# Patient Record
Sex: FEMALE | Race: BLACK OR AFRICAN AMERICAN | NOT HISPANIC OR LATINO | URBAN - METROPOLITAN AREA
[De-identification: names, ages, dates, MRNs, and addresses within clinical notes are randomized per-mention and may not be internally consistent; named-entity substitution may affect disease eponyms.]

---

## 2023-03-31 ENCOUNTER — HOSPITAL ENCOUNTER (OUTPATIENT)
Dept: DATA CONVERSION | Facility: HOSPITAL | Age: 28
End: 2023-03-31
Attending: OBSTETRICS & GYNECOLOGY

## 2023-03-31 DIAGNOSIS — E66.9 OBESITY, UNSPECIFIED: ICD-10-CM
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
## 2023-03-31 DIAGNOSIS — M54.05 PANNICULITIS AFFECTING REGIONS OF NECK AND BACK, THORACOLUMBAR REGION: ICD-10-CM

## 2023-03-31 DIAGNOSIS — O99.212 OBESITY COMPLICATING PREGNANCY, SECOND TRIMESTER (HHS-HCC): ICD-10-CM

## 2023-03-31 DIAGNOSIS — Z3A.25 25 WEEKS GESTATION OF PREGNANCY (HHS-HCC): ICD-10-CM

## 2023-03-31 DIAGNOSIS — O99.891 OTHER SPECIFIED DISEASES AND CONDITIONS COMPLICATING PREGNANCY (HHS-HCC): ICD-10-CM

## 2023-03-31 DIAGNOSIS — M54.50 LOW BACK PAIN, UNSPECIFIED: ICD-10-CM

## 2023-03-31 LAB — POCT GLUCOSE: 94 MG/DL (ref 74–99)

## 2023-04-20 LAB
ERYTHROCYTE DISTRIBUTION WIDTH (RATIO) BY AUTOMATED COUNT: 11.9 % (ref 11.5–14.5)
ERYTHROCYTE MEAN CORPUSCULAR HEMOGLOBIN CONCENTRATION (G/DL) BY AUTOMATED: 31.7 G/DL (ref 32–36)
ERYTHROCYTE MEAN CORPUSCULAR VOLUME (FL) BY AUTOMATED COUNT: 97 FL (ref 80–100)
ERYTHROCYTES (10*6/UL) IN BLOOD BY AUTOMATED COUNT: 3.58 X10E12/L (ref 4–5.2)
GLUCOSE THREE HOUR: 98 MG/DL
GLUCOSE TWO HOUR: 102 MG/DL
GLUCOSE, FASTING: 75 MG/DL
GLUCOSE, ONE HOUR: 109 MG/DL
GTTCM: NORMAL
HEMATOCRIT (%) IN BLOOD BY AUTOMATED COUNT: 34.7 % (ref 36–46)
HEMOGLOBIN (G/DL) IN BLOOD: 11 G/DL (ref 12–16)
LEUKOCYTES (10*3/UL) IN BLOOD BY AUTOMATED COUNT: 10.5 X10E9/L (ref 4.4–11.3)
NRBC (PER 100 WBCS) BY AUTOMATED COUNT: 0 /100 WBC (ref 0–0)
PLATELETS (10*3/UL) IN BLOOD AUTOMATED COUNT: 279 X10E9/L (ref 150–450)
REFLEX ADDED, ANEMIA PANEL: ABNORMAL

## 2023-04-21 LAB — SYPHILIS TOTAL AB: NONREACTIVE

## 2023-06-15 LAB — GROUP B STREP SCREEN: NORMAL

## 2023-07-24 LAB
APPEARANCE, URINE: ABNORMAL
BACTERIA, URINE: ABNORMAL /HPF
BILIRUBIN, URINE: NEGATIVE
BLOOD, URINE: ABNORMAL
COLOR, URINE: ABNORMAL
GLUCOSE, URINE: NEGATIVE MG/DL
KETONES, URINE: NEGATIVE MG/DL
LEUKOCYTE ESTERASE, URINE: ABNORMAL
MUCUS, URINE: ABNORMAL /LPF
NITRITE, URINE: POSITIVE
PH, URINE: 5 (ref 5–8)
PROTEIN, URINE: ABNORMAL MG/DL
RBC, URINE: 57 /HPF (ref 0–5)
SPECIFIC GRAVITY, URINE: 1.02 (ref 1–1.03)
SQUAMOUS EPITHELIAL CELLS, URINE: 3 /HPF
TRANSITIONAL EPITHELIAL CELLS, URINE: 2 /HPF
UROBILINOGEN, URINE: <2 MG/DL (ref 0–1.9)
WBC, URINE: 59 /HPF (ref 0–5)

## 2023-07-27 LAB — URINE CULTURE: ABNORMAL

## 2023-09-07 VITALS — WEIGHT: 218.03 LBS | BODY MASS INDEX: 37.22 KG/M2 | HEIGHT: 64 IN

## 2023-09-14 NOTE — PROGRESS NOTES
Current Stage:   Stage: Triage     OB Dating:   EDC/EGA:  ·  EGA 25.3     Subjective Data:   Antepartum:  Vaginal Bleeding: No   Contractions/Abdominal Pain: No   Discharge/Loss of Fluid: No   Fetal Movement: Good   Fevers/Chills: No   Preeclampsia Symptoms: No   Antepartum:    Mandie is a 26yo  at 25.3 d/b LMP c/w 10wk US who presents s/p fall.    HPI: Fall occurred at midnight today as patient was rocking her 2 year old to sleep. She stood up from a chair, felt her leg was asleep d/t prolonged period of sitting and fell backwards into the chair. She hit her backside on the chair and her 2 year  old was in her arms, maybe hitting the top of her belly- pt is unsure. She reports good FM before and after the fall. She denies VB, LOF, ctx or crampy abd pain.     pregnancy notable for:  - failed 1 hr gtt, 3 hr gtt scheduled   - class II obesity     OBHx:   -  at 40wks   GynHx: no STIs, denies abnl PAPs   PMH: none  PSH: none  All: zofran  FamilyHx: brother w/ trisomy 18  Meds: PNV  Social: denies          Objective Information:    Objective Information:      T   P  R  BP   MAP  SpO2   Value  36.4  81  16  107/61   79  99%  Date/Time 3/31 15:29 3/31 16:35 3/31 15:29 3/31 15:29  3/31 15:29 3/31 16:35  Range  (36.4C - 36.4C )  (81 - 94 )  (16 - 16 )  (107 - 107 )/ (61 - 61 )  (79 - 79 )  (98% - 99% )      Pain reported at 3/31 15:29: 0 = None      Physical Exam:   Constitutional: alert, oriented   Obstetric: FHT: 145, mod variability, +accels, -decels   TOCO: quiet   Respiratory/Thorax: normal respiratory effort, on  room air   Cardiovascular: warm, well perfused   Gastrointestinal: gravid, soft, non-tender, no rigidity  or guarding   Musculoskeletal: LEARY   Extremities: no erythema, edema, or tenderness to  palpation of b/l calves   Neurological: no deficits   Psychological: appropriate affect   Skin: no rashes or lesions      Testing:   NST Interpretation - Baby A:  ·  Baseline    ·   Variability moderate (amplitude range 6 to 25 bpm)   ·  Interpretation Appropriate for EGA (2 10x10 accels)   ·  Accelerations +   ·  Decelerations -     Assessment and Plan:   Assessment:    Mandie is a 28yo  at 25.3 d/b LMP c/w 10wk US who presents s/p fall.    S/p Fall  - fall onto backside occurred 14 hrs ago w/o significant abdominal trauma  - good fetal movement, no vag bleeding, cramping or pain  - recommended tylenol, heating packs for lower back pain  - reviewed warning s/sx and return precautions      IUP at 25.3  - NST reactive  - Good fetal movement  - Continue routine prenatal care  - Precautions to return discussed     Maternal Well-being  - Vital signs stable and WNL  - Emotional support and reassurance provided  - All questions and concerns addressed    D/w Dr. Lew.    Sonya Santizo PA-C  [employer] / Vocera     Plan of Care Reviewed With:  Plan of Care Reviewed With: patient     Attestation:   Note Completion:  I am a:  Advanced Practice Provider   Attending Only - Shared Visit with Advanced Practice Provider This is a shared visit.  I have reviewed the Advanced Practice Provider?s encounter note, approve the Advanced Practice Provider?s documentation,  and provide the following additional information from my personal encounter.    Comments/ Additional Findings    Patient seen. Agree with assessment and plan as documented.     Nita Lew MD  OB/GYN Attending Physician           Electronic Signatures:  Nita Lew)  (Signed 31-Mar-2023 17:00)   Authored: Note Completion   Co-Signer: Current Stage, OB Dating, Subjective Data, Objective Data,  Testing, Assessment and Plan, Note Completion  Sonya Santizo (PAC)  (Signed 31-Mar-2023 16:51)   Authored: Current Stage, OB Dating, Subjective Data,  Objective Data,  Testing, Assessment and Plan, Note Completion      Last Updated: 31-Mar-2023 17:00 by Nita Lew)

## 2023-10-12 PROBLEM — N91.2 AMENORRHEA: Status: ACTIVE | Noted: 2023-10-12

## 2023-10-12 PROBLEM — N76.1 SUBACUTE VAGINITIS: Status: ACTIVE | Noted: 2023-10-12

## 2023-10-12 PROBLEM — K59.00 CONSTIPATION: Status: ACTIVE | Noted: 2023-10-12

## 2023-10-12 PROBLEM — K60.2 ANAL FISSURE: Status: ACTIVE | Noted: 2023-10-12

## 2023-10-12 PROBLEM — F41.9 ANXIETY: Status: ACTIVE | Noted: 2023-10-12

## 2023-11-16 ENCOUNTER — OFFICE VISIT (OUTPATIENT)
Dept: OBSTETRICS AND GYNECOLOGY | Facility: CLINIC | Age: 28
End: 2023-11-16
Payer: COMMERCIAL

## 2023-11-16 VITALS
HEIGHT: 65 IN | SYSTOLIC BLOOD PRESSURE: 110 MMHG | WEIGHT: 195 LBS | BODY MASS INDEX: 32.49 KG/M2 | DIASTOLIC BLOOD PRESSURE: 60 MMHG

## 2023-11-16 DIAGNOSIS — Z12.4 SCREENING FOR CERVICAL CANCER: ICD-10-CM

## 2023-11-16 DIAGNOSIS — Z01.419 WELL WOMAN EXAM: Primary | ICD-10-CM

## 2023-11-16 PROCEDURE — 88175 CYTOPATH C/V AUTO FLUID REDO: CPT

## 2023-11-16 PROCEDURE — 87624 HPV HI-RISK TYP POOLED RSLT: CPT

## 2023-11-16 PROCEDURE — 1036F TOBACCO NON-USER: CPT | Performed by: ADVANCED PRACTICE MIDWIFE

## 2023-11-16 PROCEDURE — 99395 PREV VISIT EST AGE 18-39: CPT | Performed by: ADVANCED PRACTICE MIDWIFE

## 2023-11-16 ASSESSMENT — ENCOUNTER SYMPTOMS
EYES NEGATIVE: 0
RESPIRATORY NEGATIVE: 0
NEUROLOGICAL NEGATIVE: 0
ENDOCRINE NEGATIVE: 0
GASTROINTESTINAL NEGATIVE: 0
MUSCULOSKELETAL NEGATIVE: 0
CARDIOVASCULAR NEGATIVE: 0
ALLERGIC/IMMUNOLOGIC NEGATIVE: 0
PSYCHIATRIC NEGATIVE: 0
HEMATOLOGIC/LYMPHATIC NEGATIVE: 0
CONSTITUTIONAL NEGATIVE: 0

## 2023-11-16 ASSESSMENT — PAIN SCALES - GENERAL: PAINLEVEL: 0-NO PAIN

## 2023-12-01 LAB
CYTOLOGY CMNT CVX/VAG CYTO-IMP: NORMAL
HPV HR 12 DNA GENITAL QL NAA+PROBE: NEGATIVE
HPV HR GENOTYPES PNL CVX NAA+PROBE: NEGATIVE
HPV16 DNA SPEC QL NAA+PROBE: NEGATIVE
HPV18 DNA SPEC QL NAA+PROBE: NEGATIVE
LAB AP CONTRACEPTIVE HISTORY: NORMAL
LAB AP HPV GENOTYPE QUESTION: YES
LAB AP HPV HR: NORMAL
LABORATORY COMMENT REPORT: NORMAL
PATH REPORT.TOTAL CANCER: NORMAL

## 2024-05-12 NOTE — PATIENT INSTRUCTIONS
It was nice meeting you today.    Please go to [facility] lab or another [facility] lab facility to complete the lab testing that I ordered     Please call referral line to schedule appts with: 1) genetics for family history of breast and colon cancer;2) GI for guidance on when to start  colon cancer screening given family history of polyps and colon cancer; ad 3) high risk breast clinic for guidance on when to start mammograms given family history of breast cancer      Please continue annual well woman visits with GYN    I recommend the updated COVID vaccine that can be obtained at your local pharmacy    Please schedule a follow up with me in 4  weeks to discuss and review your test results and 8 weeks for a physical

## 2024-05-12 NOTE — PROGRESS NOTES
"Subjective   Mandie Matson \"Jo\" is a 28 y.o. female who presents for NPV TO ESTABLISH PCP CARE and FOR CARE GAP REVIEW.    HPI:      29 yo female (smoking status) who presents for NPV TO ESTABLISH PCP CARE and FOR CARE GAP REVIEW.     EMR/EPIC records reviewed.    PMHx:  Anxiety  Anal fissure  constipation    Healthcare Providers:  GYN: Dior Peacock, APRN-CNM ; last seen 11/16/23    Preventive Health Services:  -Last physical: ?  -last pap smear: 11/16/23 Negative for intraepithelial lesion or malignancy.   -last mammogram: NEVER; POSITIVE Fhx BREAST CANCER: PATERNAL AUNT (passed in 30s/40s) and grandmother  (60s) passed breast cancer==> will refer to genetics ==>?? DUE at Age 39 yo  -last colonoscopy:  NEVER; POSITIVE FHx colon cancer: MATERNAL GF (passed at age 38)==> will refer to genetics ==>?? DUE at Age 44 yo  -last STI screening: ?  -Hep C screening: ?      Immunizations:   -Childhood vaccines: completed per patient    -COVID vaccine and booster: see below  -updated COVID spike vaccine: NOW DUE      Immunization History   Administered Date(s) Administered    DTaP, Unspecified 1993, 1993, 03/31/1994, 09/06/1996, 02/22/1999    Flu vaccine (IIV4), preservative free *Check age/dose* 11/19/2021, 12/04/2022    HPV, Quadrivalent 06/10/2009, 12/09/2009, 10/14/2010    Hepatitis A vaccine, pediatric/adolescent (HAVRIX, VAQTA) 06/10/2009, 12/09/2009    Hepatitis B vaccine, pediatric/adolescent (RECOMBIVAX, ENGERIX) 1993, 09/03/1997, 12/04/1997, 05/11/1998    HiB PRP-T conjugate vaccine (HIBERIX, ACTHIB) 12/04/1997    HiB, unspecified 1993, 1993, 03/31/1994    Influenza, Unspecified 10/19/2015, 10/13/2016, 10/07/2019    Influenza, injectable, quadrivalent 10/13/2017    Influenza, seasonal, injectable 10/23/2014    MMR vaccine, subcutaneous (MMR II) 09/06/1996, 02/22/1999, 10/09/2013    Meningococcal ACWY vaccine (MENVEO) 11/11/2011    Meningococcal MCV4P 01/30/2008    Pfizer " "Purple Cap SARS-CoV-2 11/19/2021, 01/17/2022    Polio, Unspecified 1993, 1993, 03/31/1994, 02/22/1999    Tdap vaccine, age 7 year and older (BOOSTRIX, ADACEL) 01/30/2008, 01/10/2018, 03/08/2021    Varicella vaccine, subcutaneous (VARIVAX) 09/23/1997, 01/30/2008       Today Jo  reports:    - ongoing intermittent carpal tunnel symptoms x 2+ months, characterized by numbness and tingling in thumb, second finger, and half of third finger rated in severity as 6-7/10, worsening over time. She expressed interest in referral to orthopedic surgery and neurology    -otherwise doing and feeling well.       Today  she has no reported complaints, issues, or problems.  ROS is NEG for HEADACHE, NAUSEA, VOMITING, DIARRHEA, CHEST PAIN, SOB, and BLEEDING.    Review of systems  (10+) is negative for all systems except for any identified issues in HPI above.        Objective     /80   Pulse 92   Temp 36.3 °C (97.4 °F)   Resp 18   Ht 1.638 m (5' 4.5\")   SpO2 99%   BMI 32.95 kg/m²      Physical Examination:       GENERAL           General Appearance: well-appearing, well-developed, well-hydrated, well-nourished, no acute distress.        HEENT           NECK supple, no masses or thyromegaly, no carotid bruit.        EYES           Extraocular Movements: normal, bilateral eyes MARICRUZ, no conjunctival injection.        HEART           Rate and Rhythm regular rate and rhythm. Heart sounds: normal S1S2, no S3 or S4. Murmurs: none.        CHEST           Breath sounds: Clear to IPPA, RR<16 no use of accessory muscles.        ABDOMEN           General: Neg for LKKS or masses, no scleral icterus or jaundice.        MUSCULOSKELETAL           Joints Demonstration: Neg for erythema, swelling or joint deformities. gross abnormalities no gross abnormalities.        EXTREMITIES           Lower Extremities: Neg for cyanosis, clubbing or edema.       Assessment/Plan   Problem List Items Addressed This Visit       Anxiety "     Other Visit Diagnoses       Encounter to establish care    -  Primary    Lipid screening        Diabetes mellitus screening        Vitamin D deficiency        Encounter for hepatitis C screening test for low risk patient        Routine screening for STI (sexually transmitted infection)        Immunization counseling              Establish PCP care  -labs ordered (see A/P above for details)    Lipid Disorder screening  -lipid panel ordered    Diabetes Screening  -HgBA1c ordered    Vitamin D deficiency  -Vit D levels ordered     Hep C screening  -Hep C antibody ordered     STI Screening:  -HIV, syphilis, GC/CT, trich ordered    Carpal tunnel symptoms:  -referral to orthopedic surgery and neurology ordered    Breast Cancer Screening: + Fhx breast cancer; MAMMOGRAM  likely DUE  before Age 40  -referral to genetics and high risk breast clinic ordered      Colon Cancer Screening: + Fhx polyps and colon cancer  -referral to GI ordered for guidance on age to start colonoscopy/colon cancer screening    Cervical Cancer Screening; last pap smear 11/16/23  -cont annual well woman visit and pap smears per GYN     Counseling:       Medication education:         Education:  The patient is counseled regarding potential side-effects of all new medications        Understanding:  Patient expressed understanding        Adherence:  No barriers to adherence identified        Immunizations Counseling  -recommend updated COVID spike vaccine that can be obtained at local pharmacy     FOLLOW-UP: 4 weeks to discuss and review test results and 8 weeks for PHYSICAL     Discussed recommended plan of care with patient. Patient expressed understanding and agreement with plan of care. All of patient's questions were answered at the time. Patient had no additional questions at the time.             Evie Campos MD, PhD

## 2024-05-13 ENCOUNTER — OFFICE VISIT (OUTPATIENT)
Dept: PRIMARY CARE | Facility: CLINIC | Age: 29
End: 2024-05-13
Payer: MEDICARE

## 2024-05-13 VITALS
TEMPERATURE: 97.4 F | SYSTOLIC BLOOD PRESSURE: 124 MMHG | OXYGEN SATURATION: 99 % | BODY MASS INDEX: 32.95 KG/M2 | RESPIRATION RATE: 18 BRPM | HEART RATE: 92 BPM | DIASTOLIC BLOOD PRESSURE: 80 MMHG | HEIGHT: 65 IN

## 2024-05-13 DIAGNOSIS — Z11.3 ROUTINE SCREENING FOR STI (SEXUALLY TRANSMITTED INFECTION): ICD-10-CM

## 2024-05-13 DIAGNOSIS — Z11.59 ENCOUNTER FOR HEPATITIS C SCREENING TEST FOR LOW RISK PATIENT: ICD-10-CM

## 2024-05-13 DIAGNOSIS — E55.9 VITAMIN D DEFICIENCY: ICD-10-CM

## 2024-05-13 DIAGNOSIS — Z76.89 ENCOUNTER TO ESTABLISH CARE: Primary | ICD-10-CM

## 2024-05-13 DIAGNOSIS — Z13.220 LIPID SCREENING: ICD-10-CM

## 2024-05-13 DIAGNOSIS — F41.9 ANXIETY: ICD-10-CM

## 2024-05-13 DIAGNOSIS — Z80.3 FAMILY HISTORY OF BREAST CANCER: ICD-10-CM

## 2024-05-13 DIAGNOSIS — G56.00 CARPAL TUNNEL SYNDROME, UNSPECIFIED LATERALITY: ICD-10-CM

## 2024-05-13 DIAGNOSIS — Z13.1 DIABETES MELLITUS SCREENING: ICD-10-CM

## 2024-05-13 DIAGNOSIS — Z80.0 FAMILY HISTORY OF COLON CANCER: ICD-10-CM

## 2024-05-13 DIAGNOSIS — Z71.85 IMMUNIZATION COUNSELING: ICD-10-CM

## 2024-05-13 PROCEDURE — 99204 OFFICE O/P NEW MOD 45 MIN: CPT | Performed by: FAMILY MEDICINE

## 2024-05-13 PROCEDURE — 1036F TOBACCO NON-USER: CPT | Performed by: FAMILY MEDICINE

## 2024-05-13 ASSESSMENT — COLUMBIA-SUICIDE SEVERITY RATING SCALE - C-SSRS
2. HAVE YOU ACTUALLY HAD ANY THOUGHTS OF KILLING YOURSELF?: NO
1. IN THE PAST MONTH, HAVE YOU WISHED YOU WERE DEAD OR WISHED YOU COULD GO TO SLEEP AND NOT WAKE UP?: NO
6. HAVE YOU EVER DONE ANYTHING, STARTED TO DO ANYTHING, OR PREPARED TO DO ANYTHING TO END YOUR LIFE?: NO

## 2024-05-13 ASSESSMENT — PATIENT HEALTH QUESTIONNAIRE - PHQ9
1. LITTLE INTEREST OR PLEASURE IN DOING THINGS: NOT AT ALL
2. FEELING DOWN, DEPRESSED OR HOPELESS: NOT AT ALL
SUM OF ALL RESPONSES TO PHQ9 QUESTIONS 1 AND 2: 0

## 2024-05-13 ASSESSMENT — PAIN SCALES - GENERAL: PAINLEVEL: 3

## 2024-08-16 ENCOUNTER — TELEPHONE (OUTPATIENT)
Dept: OBSTETRICS AND GYNECOLOGY | Facility: CLINIC | Age: 29
End: 2024-08-16
Payer: MEDICARE

## 2024-08-16 NOTE — TELEPHONE ENCOUNTER
Name/ verified  Pt had Nexplanon placed 1 year ago. She experienced a regular menses on 24 and states it was a regular menses flow. She then today is having another menses. Reviewed with pt that you can have breakthrough bleeding while on the Nexplanon. Denies heavy bleeding. Reviewed bleeding precautions.   Denies any issues at the site.   Pt states she has gain weight since on the Nexplanon as well. Pt will continue to monitor and will call to schedule an apt to discuss other birth control methods if she chooses.

## 2024-10-28 ENCOUNTER — APPOINTMENT (OUTPATIENT)
Dept: OBSTETRICS AND GYNECOLOGY | Facility: CLINIC | Age: 29
End: 2024-10-28
Payer: MEDICARE

## 2024-10-28 VITALS — SYSTOLIC BLOOD PRESSURE: 116 MMHG | DIASTOLIC BLOOD PRESSURE: 80 MMHG | WEIGHT: 220 LBS | BODY MASS INDEX: 37.18 KG/M2

## 2024-10-28 DIAGNOSIS — Z30.46 NEXPLANON REMOVAL: Primary | ICD-10-CM

## 2024-10-28 PROCEDURE — 11982 REMOVE DRUG IMPLANT DEVICE: CPT | Performed by: ADVANCED PRACTICE MIDWIFE

## 2024-10-28 ASSESSMENT — PAIN SCALES - GENERAL: PAINLEVEL_OUTOF10: 0-NO PAIN

## 2025-06-30 ENCOUNTER — TELEPHONE (OUTPATIENT)
Dept: OBSTETRICS AND GYNECOLOGY | Facility: CLINIC | Age: 30
End: 2025-06-30

## 2025-06-30 DIAGNOSIS — B37.31 YEAST INFECTION OF THE VAGINA: ICD-10-CM

## 2025-06-30 RX ORDER — FLUCONAZOLE 150 MG/1
150 TABLET ORAL ONCE
Qty: 2 TABLET | Refills: 0 | Status: SHIPPED | OUTPATIENT
Start: 2025-06-30 | End: 2025-06-30

## 2025-06-30 NOTE — TELEPHONE ENCOUNTER
"Jo reports vaginal itching and \"cottage cheese\" type vaginal discharge.  She denies pelvic pain, vaginal odor, fever, back pain.    Denies difficulty on urination.    She is requesting diflucan as she has taken in the past for yeast.    Will check with SUNITA Peacock.    "

## 2025-07-08 ENCOUNTER — PROCEDURE VISIT (OUTPATIENT)
Dept: OBSTETRICS AND GYNECOLOGY | Facility: CLINIC | Age: 30
End: 2025-07-08
Payer: COMMERCIAL

## 2025-07-08 VITALS — DIASTOLIC BLOOD PRESSURE: 75 MMHG | BODY MASS INDEX: 37.18 KG/M2 | SYSTOLIC BLOOD PRESSURE: 112 MMHG | WEIGHT: 220 LBS

## 2025-07-08 DIAGNOSIS — Z30.017 NEXPLANON INSERTION: Primary | ICD-10-CM

## 2025-07-08 DIAGNOSIS — N92.6 MISSED MENSES: ICD-10-CM

## 2025-07-08 LAB — PREGNANCY TEST URINE, POC: NEGATIVE

## 2025-07-08 PROCEDURE — 11981 INSERTION DRUG DLVR IMPLANT: CPT

## 2025-07-08 PROCEDURE — 81025 URINE PREGNANCY TEST: CPT | Performed by: ADVANCED PRACTICE MIDWIFE

## 2025-07-08 ASSESSMENT — ENCOUNTER SYMPTOMS
EYES NEGATIVE: 0
HEMATOLOGIC/LYMPHATIC NEGATIVE: 0
RESPIRATORY NEGATIVE: 0
NEUROLOGICAL NEGATIVE: 0
ENDOCRINE NEGATIVE: 0
ALLERGIC/IMMUNOLOGIC NEGATIVE: 0
CARDIOVASCULAR NEGATIVE: 0
CONSTITUTIONAL NEGATIVE: 0
GASTROINTESTINAL NEGATIVE: 0
MUSCULOSKELETAL NEGATIVE: 0
PSYCHIATRIC NEGATIVE: 0

## 2025-07-08 ASSESSMENT — PAIN SCALES - GENERAL: PAINLEVEL_OUTOF10: 0-NO PAIN

## 2025-07-08 NOTE — PROGRESS NOTES
Insertion of Contraceptive Capsule    Date/Time: 7/8/2025 4:05 PM    Performed by: VIOLETA Martinez  Authorized by: VIOLETA Martinez    Consent:     Consent obtained:  Verbal    Consent given by:  Patient    Procedural risks discussed:  Bleeding, infection and possible continued pain    Patient questions answered: yes      Patient agrees, verbalizes understanding, and wants to proceed: yes      Educational handouts given: no      Instructions and paperwork completed: no    Indication:     Indication: Insertion of non-biodegradable drug delivery implant    Pre-procedure:     Pre-procedure timeout performed: yes      Prepped with: povidone-iodine      Local anesthetic:  Lidocaine with epinephrine    The site was cleaned and prepped in a sterile fashion: yes    Procedure:     Procedure:  Insertion    Small stab incision was made in arm: no      Left/right:  Left    Preloaded contraceptive capsule trocar was placed subdermally: yes      Visualization of implant was obtained: yes      Contraceptive capsule was inserted and trocar removed: yes      Visualization of notch in stylet and palpation of device: yes      Palpation confirms placement by provider and patient: yes      Site was closed with steri-strips and pressure bandage applied: yes

## 2025-08-02 NOTE — PROGRESS NOTES
"Olya Matson \"Jo\" is a 29 y.o. female who presents for annual physical exam.    HPI:      29 y.o. female who presents for annual physical exam.       EMR/[facility] records reviewed.    Last seen by me on 5/13/2024 for NPV TO ESTABLISH PCP CARE and FOR CARE GAP REVIEW.  At visit:     Establish PCP care  -labs ordered (see A/P above for details)     Lipid Disorder screening  -lipid panel ordered     Diabetes Screening  -HgBA1c ordered     Vitamin D deficiency  -Vit D levels ordered     Hep C screening  -Hep C antibody ordered     STI Screening:  -HIV, syphilis, GC/CT, trich ordered     Carpal tunnel symptoms:  -referral to orthopedic surgery and neurology ordered     Breast Cancer Screening: + Fhx breast cancer; MAMMOGRAM  likely DUE  before Age 40  -referral to genetics and high risk breast clinic ordered        Colon Cancer Screening: + Fhx polyps and colon cancer  -referral to GI ordered for guidance on age to start colonoscopy/colon cancer screening     Cervical Cancer Screening; last pap smear 11/16/23  -cont annual well woman visit and pap smears per GYN      Immunizations Counseling  -recommend updated COVID spike vaccine that can be obtained at local pharmacy     FOLLOW-UP: 4 weeks to discuss and review test results and 8 weeks for PHYSICAL         PMHx:  History Anxiety  Anal fissure  Constipation  nexplanon     Healthcare Providers:  GYN: VIOLETA Matias      Preventive Health Services:  -Last physical: 8/5/25  -last pap smear: 11/16/23 Negative for intraepithelial lesion or malignancy, negative HR HPV  -last mammogram: NEVER; POSITIVE Fhx BREAST CANCER: PATERNAL AUNT (passed in 30s/40s) and paternal grandmother  (50/60s) passed breast cancer==> referral to genetics ordered 5/2024 ==>?? Mammogram  DUE at Age 37 yo or may be due at a younger   -last colonoscopy:  NEVER; POSITIVE FHx colon cancer: MATERNAL GF (passed at age 40); her brother diagnosed with colon polyps at age 15 yo==> " referral to genetics ordered 5/2024 will refer to genetics ==>?? Colon cancer screening  DUE at Age 44 yo or earlier age based on genetics recommendations  -last STI screening: ?  -Hep C screening: ?        Immunizations:   -Childhood vaccines: completed per patient    -COVID vaccine and booster: see below  -updated COVID spike vaccine: NOW DUE        Immunization History   Administered Date(s) Administered    COVID-19, mRNA, LNP-S, PF, 30 mcg/0.3 mL dose 11/19/2021    DTaP, Unspecified 1993, 1993, 03/31/1994, 09/06/1996, 02/22/1999    Flu vaccine (IIV4), preservative free *Check age/dose* 11/19/2021, 12/04/2022    HPV, Quadrivalent 06/10/2009, 12/09/2009, 10/14/2010    Hepatitis A vaccine, pediatric/adolescent (HAVRIX, VAQTA) 06/10/2009, 12/09/2009    Hepatitis B vaccine, 19 yrs and under (RECOMBIVAX, ENGERIX) 1993, 09/03/1997, 12/04/1997, 05/11/1998    HiB PRP-T conjugate vaccine (HIBERIX, ACTHIB) 12/04/1997    HiB, unspecified 1993, 1993, 03/31/1994    Influenza, Unspecified 10/19/2015, 10/13/2016, 10/07/2019    Influenza, injectable, quadrivalent 10/13/2017    Influenza, seasonal, injectable 10/23/2014    MMR vaccine, subcutaneous (MMR II) 09/06/1996, 02/22/1999, 10/09/2013    Meningococcal ACWY vaccine (MENVEO) 11/11/2011    Meningococcal ACWY-D (Menactra) 4-valent conjugate vaccine 01/30/2008    Pfizer Purple Cap SARS-CoV-2 01/17/2022    Polio, Unspecified 1993, 1993, 03/31/1994, 02/22/1999    Tdap vaccine, age 7 year and older (BOOSTRIX, ADACEL) 01/30/2008, 01/10/2018, 03/08/2021    Varicella vaccine, subcutaneous (VARIVAX) 09/23/1997, 01/30/2008       Interval history:    - Labs ordered by me 5/2024 not completed    - Patient did not see genetics for evaluation of family history of breast and colon cancer     Today Jo  reports:     - ongoing intermittent carpal tunnel symptoms x 1+ year  characterized by numbness and tingling in thumb, second finger, and half of  "third finger rated in severity as 6-7/10, worsening over time.  At last visit 5/2024 referrals to orthopedic surgery and neurology were ordered    -ongoing weight gain; she expressed  interest in referral to nutrition and endocrinology weight management     -otherwise doing and feeling well.     -Did not schedule to see genetics for evaluation of family history of breast cancer and colon cancer; she expressed interest in new referral for genetics for evaluation.  Extensively discussed with patient it is very important that she schedules to see genetics since genetic testing may warrant earlier screening for colon cancer.      - Sexually active with 1 male partner.  She denies history of STIs    - LMP: no menses on nexplanon    - Contraception: Nexplanon        Today  she has no reported complaints, issues, or problems.  ROS is NEG for HEADACHE, NAUSEA, VOMITING, DIARRHEA, CHEST PAIN, SOB, and BLEEDING.     Review of systems  (12+) is negative for all systems except for any identified issues in HPI above.     Objective     /80   Pulse 73   Temp 36 °C (96.8 °F)   Ht 1.638 m (5' 4.5\")   Wt 101 kg (221 lb 12.8 oz)   LMP 07/07/2025 (Exact Date)   SpO2 96%   BMI 37.48 kg/m²      COMPLETE PHYSICAL EXAM    GENERAL           General Appearance: pleasant, well-appearing, well-developed, well-hydrated, well-nourished, well-groomed, NAD .        HEENT           NECK supple, diffusely enlarged thyroid, non tender to palpable, no palpable thyroid masses or nodules, Neg for adenopathy, Oropharynx normal no exudates. Ears: TMs intact, normal, no effusions.       EYES           Pupils: PERRLA, no photophobia.        HEART           Rate and Rhythm regular rate and rhythm. Heart sounds: normal S1S2. Murmurs: none.        LUNGS           Effort: Normal chest wall, no pectus, Normal air entry all fields, Clear to IPPA, RR<16 with no use of accessory muscles.        BREASTS: Patient declined chaperone           Bilaterally: " no masses, no nipple retraction, no nipple discharge, no abnormal skin changes. Axilla: no lymphadenopathy.        BACK           General: unremarkable, no spinal tenderness or rashes.        ABDOMEN           General: soft, obese, nondistended nontender to palpation,  normal to inspection, no HSM, neg for LKKS or masses and BSs heard in all quadrants                 LYMPHATICS           Cervical: none. Axillary: none.        MUSCULOSKELETAL           gross abnormalities no gross abnormalities, no joint redness or swelling.        EXTREMITIES           Varicose veins: not present. Pulses: 2+ bilateral. Clubbing: none. Cyanosis: no.        NEUROLOGICAL           Orientation: alert and oriented x 3. Grossly normal: yes. Plantars: downgoing bilaterally. Muscle Bulk: normal . Cranial Nerves: CN's II-XII grossly intact.        PSYCHOLOGY           Affect: appropriate. Mood: pleasant.        SKIN: No rashes.  No lesions.    Assessment/Plan   Problem List Items Addressed This Visit    None  Visit Diagnoses         Annual physical exam    -  Primary      Family history of breast cancer          Family history of colon cancer          Carpal tunnel syndrome, unspecified laterality          Lipid screening          Vitamin D deficiency          Encounter for hepatitis C screening test for low risk patient          Routine screening for STI (sexually transmitted infection)          Immunization counseling              Annual Physical Exam: Completed today  -labs ordered (see A/P above)    Enlarged thyroid:  -TSH/T4 ordered  -thyroid US ordered    Carpal tunnel symptoms: No red flag signs or symptoms  -referral to orthopedic surgery and neurology ordered for evaluation     Breast Cancer Screening: + Fhx breast cancer; MAMMOGRAM  likely DUE  before Age 40  -referral to genetics and breast surgery ordered for evaluation and guidance for recommendations for age to start breast cancer screening with mammogram given strong family  history of breast cancer        Colon Cancer Screening: + Fhx polyps and colon cancer  -referral to GI ordered for guidance on age to start colonoscopy/colon cancer screening  -referral to genetics ordered for evaluation     Cervical Cancer Screening; last pap smear 11/16/23  -cont annual well woman visit and pap smears per GYN     Lipid Screening  -lipid panel ordered        Vitamin D deficiency  -Vit D levels ordered     Hep C screening  -Hep C antibody ordered     STI Screening: HIV, syphilis, GC/CT/trich screening declined by patient      Cervical cancer screening: UTD  - Continue annual well woman visits with GYN and Pap smears per GYN       Counseling:      Medication education:        Education:  The patient is counseled regarding potential side-effects of all new medications        Understanding:  Patient expressed understanding        Adherence:  No barriers to adherence identified        Immunizations Counseling  - ? TDAP now due==> declined  -recommend updated COVID spike vaccine, that can be obtained at local pharmacy     FOLLOW-UP: 4 weeks to discuss and review test results    I have personally reviewed all available pertinent labs, imaging, and consult notes with the patient.     Discussed recommended plan of care with patient. Patient expressed understanding and agreement with plan of care. All of patient's  questions were answered at the time. Patient had no additional questions at the time.         Evie Campos MD, PhD

## 2025-08-05 ENCOUNTER — OFFICE VISIT (OUTPATIENT)
Dept: PRIMARY CARE | Facility: CLINIC | Age: 30
End: 2025-08-05
Payer: COMMERCIAL

## 2025-08-05 VITALS
HEART RATE: 73 BPM | HEIGHT: 65 IN | OXYGEN SATURATION: 96 % | WEIGHT: 221.8 LBS | SYSTOLIC BLOOD PRESSURE: 106 MMHG | BODY MASS INDEX: 36.96 KG/M2 | DIASTOLIC BLOOD PRESSURE: 80 MMHG | TEMPERATURE: 96.8 F

## 2025-08-05 DIAGNOSIS — G56.00 CARPAL TUNNEL SYNDROME, UNSPECIFIED LATERALITY: ICD-10-CM

## 2025-08-05 DIAGNOSIS — Z80.0 FAMILY HISTORY OF COLON CANCER: ICD-10-CM

## 2025-08-05 DIAGNOSIS — Z80.3 FAMILY HISTORY OF BREAST CANCER: ICD-10-CM

## 2025-08-05 DIAGNOSIS — Z00.00 ANNUAL PHYSICAL EXAM: Primary | ICD-10-CM

## 2025-08-05 DIAGNOSIS — E04.9 ENLARGED THYROID: ICD-10-CM

## 2025-08-05 DIAGNOSIS — Z13.220 LIPID SCREENING: ICD-10-CM

## 2025-08-05 DIAGNOSIS — Z11.59 ENCOUNTER FOR HEPATITIS C SCREENING TEST FOR LOW RISK PATIENT: ICD-10-CM

## 2025-08-05 DIAGNOSIS — E66.9 OBESITY (BMI 35.0-39.9 WITHOUT COMORBIDITY): ICD-10-CM

## 2025-08-05 DIAGNOSIS — Z11.3 ROUTINE SCREENING FOR STI (SEXUALLY TRANSMITTED INFECTION): ICD-10-CM

## 2025-08-05 DIAGNOSIS — E55.9 VITAMIN D DEFICIENCY: ICD-10-CM

## 2025-08-05 DIAGNOSIS — Z71.85 IMMUNIZATION COUNSELING: ICD-10-CM

## 2025-08-05 PROCEDURE — 3008F BODY MASS INDEX DOCD: CPT | Performed by: FAMILY MEDICINE

## 2025-08-05 PROCEDURE — 99395 PREV VISIT EST AGE 18-39: CPT | Performed by: FAMILY MEDICINE

## 2025-08-05 PROCEDURE — 1036F TOBACCO NON-USER: CPT | Performed by: FAMILY MEDICINE

## 2025-08-05 ASSESSMENT — COLUMBIA-SUICIDE SEVERITY RATING SCALE - C-SSRS
6. HAVE YOU EVER DONE ANYTHING, STARTED TO DO ANYTHING, OR PREPARED TO DO ANYTHING TO END YOUR LIFE?: NO
1. IN THE PAST MONTH, HAVE YOU WISHED YOU WERE DEAD OR WISHED YOU COULD GO TO SLEEP AND NOT WAKE UP?: NO
2. HAVE YOU ACTUALLY HAD ANY THOUGHTS OF KILLING YOURSELF?: NO

## 2025-08-05 ASSESSMENT — PATIENT HEALTH QUESTIONNAIRE - PHQ9
1. LITTLE INTEREST OR PLEASURE IN DOING THINGS: NOT AT ALL
SUM OF ALL RESPONSES TO PHQ9 QUESTIONS 1 AND 2: 0
2. FEELING DOWN, DEPRESSED OR HOPELESS: NOT AT ALL

## 2025-08-05 ASSESSMENT — PAIN SCALES - GENERAL: PAINLEVEL_OUTOF10: 0-NO PAIN

## 2025-08-22 ENCOUNTER — HOSPITAL ENCOUNTER (OUTPATIENT)
Dept: RADIOLOGY | Facility: HOSPITAL | Age: 30
Discharge: HOME | End: 2025-08-22
Payer: COMMERCIAL

## 2025-08-22 DIAGNOSIS — E04.9 ENLARGED THYROID: ICD-10-CM

## 2025-08-22 PROCEDURE — 76536 US EXAM OF HEAD AND NECK: CPT

## 2025-08-29 ENCOUNTER — APPOINTMENT (OUTPATIENT)
Dept: ORTHOPEDIC SURGERY | Facility: CLINIC | Age: 30
End: 2025-08-29
Payer: COMMERCIAL

## 2025-08-29 ENCOUNTER — HOSPITAL ENCOUNTER (OUTPATIENT)
Dept: RADIOLOGY | Facility: CLINIC | Age: 30
Discharge: HOME | End: 2025-08-29
Payer: COMMERCIAL

## 2025-08-29 VITALS — BODY MASS INDEX: 36.82 KG/M2 | HEIGHT: 65 IN | WEIGHT: 221 LBS

## 2025-08-29 DIAGNOSIS — M25.531 RIGHT WRIST PAIN: ICD-10-CM

## 2025-08-29 DIAGNOSIS — R20.2 NUMBNESS AND TINGLING IN RIGHT HAND: ICD-10-CM

## 2025-08-29 DIAGNOSIS — M25.531 RIGHT WRIST PAIN: Primary | ICD-10-CM

## 2025-08-29 DIAGNOSIS — G56.01 RIGHT CARPAL TUNNEL SYNDROME: ICD-10-CM

## 2025-08-29 DIAGNOSIS — R20.0 NUMBNESS AND TINGLING IN RIGHT HAND: ICD-10-CM

## 2025-08-29 PROCEDURE — 99203 OFFICE O/P NEW LOW 30 MIN: CPT | Performed by: PHYSICIAN ASSISTANT

## 2025-08-29 PROCEDURE — 73110 X-RAY EXAM OF WRIST: CPT | Mod: RT

## 2025-08-29 PROCEDURE — 3008F BODY MASS INDEX DOCD: CPT | Performed by: PHYSICIAN ASSISTANT

## 2025-08-29 PROCEDURE — 99213 OFFICE O/P EST LOW 20 MIN: CPT | Performed by: PHYSICIAN ASSISTANT

## 2025-08-29 PROCEDURE — 1036F TOBACCO NON-USER: CPT | Performed by: PHYSICIAN ASSISTANT

## 2025-08-29 ASSESSMENT — PATIENT HEALTH QUESTIONNAIRE - PHQ9
SUM OF ALL RESPONSES TO PHQ9 QUESTIONS 1 AND 2: 0
1. LITTLE INTEREST OR PLEASURE IN DOING THINGS: NOT AT ALL
2. FEELING DOWN, DEPRESSED OR HOPELESS: NOT AT ALL

## 2025-08-29 ASSESSMENT — ENCOUNTER SYMPTOMS
LOSS OF SENSATION IN FEET: 0
OCCASIONAL FEELINGS OF UNSTEADINESS: 0
DEPRESSION: 0

## 2025-08-29 ASSESSMENT — PAIN - FUNCTIONAL ASSESSMENT: PAIN_FUNCTIONAL_ASSESSMENT: 0-10

## 2025-08-29 ASSESSMENT — COLUMBIA-SUICIDE SEVERITY RATING SCALE - C-SSRS
2. HAVE YOU ACTUALLY HAD ANY THOUGHTS OF KILLING YOURSELF?: NO
6. HAVE YOU EVER DONE ANYTHING, STARTED TO DO ANYTHING, OR PREPARED TO DO ANYTHING TO END YOUR LIFE?: NO
1. IN THE PAST MONTH, HAVE YOU WISHED YOU WERE DEAD OR WISHED YOU COULD GO TO SLEEP AND NOT WAKE UP?: NO

## 2025-08-29 ASSESSMENT — PAIN SCALES - GENERAL
PAINLEVEL_OUTOF10: 4
PAINLEVEL_OUTOF10: 4

## 2025-09-05 ENCOUNTER — OFFICE VISIT (OUTPATIENT)
Dept: OTOLARYNGOLOGY | Facility: HOSPITAL | Age: 30
End: 2025-09-05
Payer: COMMERCIAL

## 2025-09-05 VITALS — TEMPERATURE: 98.2 F | WEIGHT: 219 LBS | HEIGHT: 65 IN | BODY MASS INDEX: 36.49 KG/M2

## 2025-09-05 DIAGNOSIS — E04.1 THYROID NODULE: Primary | ICD-10-CM

## 2025-09-05 PROCEDURE — 99213 OFFICE O/P EST LOW 20 MIN: CPT | Performed by: OTOLARYNGOLOGY

## 2025-09-05 PROCEDURE — 99203 OFFICE O/P NEW LOW 30 MIN: CPT | Performed by: OTOLARYNGOLOGY

## 2025-09-05 PROCEDURE — 1036F TOBACCO NON-USER: CPT | Performed by: OTOLARYNGOLOGY

## 2025-09-05 PROCEDURE — 3008F BODY MASS INDEX DOCD: CPT | Performed by: OTOLARYNGOLOGY

## 2025-09-05 ASSESSMENT — PATIENT HEALTH QUESTIONNAIRE - PHQ9
SUM OF ALL RESPONSES TO PHQ9 QUESTIONS 1 & 2: 0
1. LITTLE INTEREST OR PLEASURE IN DOING THINGS: NOT AT ALL
2. FEELING DOWN, DEPRESSED OR HOPELESS: NOT AT ALL

## 2025-09-05 ASSESSMENT — PAIN SCALES - GENERAL: PAINLEVEL_OUTOF10: 0-NO PAIN
